# Patient Record
Sex: FEMALE | Race: WHITE | NOT HISPANIC OR LATINO | Employment: FULL TIME | URBAN - METROPOLITAN AREA
[De-identification: names, ages, dates, MRNs, and addresses within clinical notes are randomized per-mention and may not be internally consistent; named-entity substitution may affect disease eponyms.]

---

## 2018-08-31 LAB
EXTERNAL CHLAMYDIA RESULT: NEGATIVE
N GONORRHOEA RRNA SPEC QL PROBE: NEGATIVE

## 2021-06-15 ENCOUNTER — TELEPHONE (OUTPATIENT)
Dept: OBGYN CLINIC | Facility: CLINIC | Age: 21
End: 2021-06-15

## 2021-06-15 DIAGNOSIS — Z30.011 ENCOUNTER FOR PRESCRIPTION OF ORAL CONTRACEPTIVES: Primary | ICD-10-CM

## 2021-06-15 RX ORDER — DROSPIRENONE AND ETHINYL ESTRADIOL 0.02-3(28)
1 KIT ORAL DAILY
Qty: 28 TABLET | Refills: 0 | Status: SHIPPED | OUTPATIENT
Start: 2021-06-15 | End: 2021-07-15 | Stop reason: SDUPTHER

## 2021-06-15 NOTE — TELEPHONE ENCOUNTER
Paloma Antelmo called requesting additional refill of OCP  Advised she  Was informed on 3/24/2021 she would need to be seen before additional refills could be provided per provider  Patient cancelled [facility] 2/2021  She was in agreement to schedule and stated she would call back in 2 weeks to schedule  Patient called to schedule [place] today  Provided appt for 7/15/2021  She was aware she was advised needed office appt prior to additional refills  Paloma Rodriges requests nurse to send her message to her provider  Tona Barajas, please advise

## 2021-06-28 ENCOUNTER — VBI (OUTPATIENT)
Dept: ADMINISTRATIVE | Facility: OTHER | Age: 21
End: 2021-06-28

## 2021-06-28 NOTE — TELEPHONE ENCOUNTER
Upon review of the In Basket request we were able to locate, review, and update the patient chart as requested for Chlamydia and Pap Smear (HPV) aka Cervical Cancer Screening  Any additional questions or concerns should be emailed to the Practice Liaisons via [email or web address] email, please do not reply via In Basket      Thank you  Rosana Hartmann

## 2021-07-15 ENCOUNTER — ANNUAL EXAM (OUTPATIENT)
Dept: OBGYN CLINIC | Facility: CLINIC | Age: 21
End: 2021-07-15
Payer: COMMERCIAL

## 2021-07-15 VITALS
SYSTOLIC BLOOD PRESSURE: 100 MMHG | BODY MASS INDEX: 19.53 KG/M2 | DIASTOLIC BLOOD PRESSURE: 68 MMHG | HEIGHT: 65 IN | WEIGHT: 117.2 LBS

## 2021-07-15 DIAGNOSIS — R87.612 LGSIL ON PAP SMEAR OF CERVIX: ICD-10-CM

## 2021-07-15 DIAGNOSIS — Z01.419 ENCOUNTER FOR GYNECOLOGICAL EXAMINATION WITHOUT ABNORMAL FINDING: Primary | ICD-10-CM

## 2021-07-15 DIAGNOSIS — Z30.011 ENCOUNTER FOR PRESCRIPTION OF ORAL CONTRACEPTIVES: ICD-10-CM

## 2021-07-15 DIAGNOSIS — Z12.4 ENCOUNTER FOR PAPANICOLAOU SMEAR FOR CERVICAL CANCER SCREENING: ICD-10-CM

## 2021-07-15 DIAGNOSIS — Z11.3 SCREEN FOR STD (SEXUALLY TRANSMITTED DISEASE): ICD-10-CM

## 2021-07-15 PROCEDURE — 99395 PREV VISIT EST AGE 18-39: CPT | Performed by: NURSE PRACTITIONER

## 2021-07-15 RX ORDER — DROSPIRENONE AND ETHINYL ESTRADIOL 0.02-3(28)
1 KIT ORAL DAILY
Qty: 84 TABLET | Refills: 3 | Status: SHIPPED | OUTPATIENT
Start: 2021-07-15

## 2021-07-15 RX ORDER — FLUOXETINE 10 MG/1
1 CAPSULE ORAL EVERY MORNING
COMMUNITY
Start: 2021-06-24

## 2021-07-15 NOTE — PATIENT INSTRUCTIONS
Pap repeated today Low grade change last year  STI testing as indicated, exercise most days of week, obtain appropriate diet and hydration, Calcium 1000mg + 600 vit D daily, birth control as directed  RX renewed  Condom use when sexually active for sexually transmitted infection prevention     Annual mammogram starting at age 34, monthly breast self exam  [facility] 20 times twice daily

## 2021-07-15 NOTE — PROGRESS NOTES
Assessment/Plan:  Pap repeated today Low grade change last year  Discussed nature of HPV Did receive Gardisil STI testing as indicated, exercise most days of week, obtain appropriate diet and hydration, Calcium 1000mg + 600 vit D daily, birth control as directed  RX renewed  Condom use when sexually active for sexually transmitted infection prevention     Annual mammogram starting at age 34, monthly breast self exam  Armida Schneider 20 times twice daily       Diagnoses and all orders for this visit:    Encounter for gynecological examination without abnormal finding  -     Cancel: IGP, CtNg, rfx Aptima HPV ASCU  -     IGP, CtNg, rfx Aptima HPV ASCU    Encounter for Papanicolaou smear for cervical cancer screening  -     Cancel: IGP, CtNg, rfx Aptima HPV ASCU  -     IGP, CtNg, rfx Aptima HPV ASCU    Encounter for prescription of oral contraceptives    LGSIL on Pap smear of cervix    Screen for STD (sexually transmitted disease)  -     Cancel: IGP, CtNg, rfx Aptima HPV ASCU  -     IGP, CtNg, rfx Aptima HPV ASCU    Other orders  -     FLUoxetine (PROzac) 10 mg capsule; Take 1 capsule by mouth every morning  -     Multiple Vitamins-Minerals (MULTIVITAMIN WOMEN PO); Take 1 tablet by mouth daily          Subjective:      Patient ID: Susu Dominguez is a 23 y o  female  Here for annual gyn exam   On Ocp Periods monthly normal   Denies BTB  Denies abdominal or pelvic pain  Bowel and bladder are normal Studied abroad in [place] last year was 2 1/2 mos then sent home due to covid  [facility] [place], [place] 12/2019 LGSIL for repeat pap today 4 new partners the last year  Did receive Gardisil   Got a new job in [employer], Will move in Aug      The following portions of the patient's history were reviewed and updated as appropriate: allergies, current medications, past family history, past medical history, past social history, past surgical history and problem list     Review of Systems Constitutional: Negative for fatigue and unexpected weight change  Respiratory: Negative for shortness of breath  Cardiovascular: Negative for chest pain and palpitations  Gastrointestinal: Negative for abdominal distention, abdominal pain, constipation and diarrhea  Genitourinary: Negative for difficulty urinating, dyspareunia, dysuria, frequency, genital sores, menstrual problem, pelvic pain, urgency, vaginal bleeding, vaginal discharge and vaginal pain  Neurological: Negative for headaches  Psychiatric/Behavioral: Negative  Negative for dysphoric mood  The patient is not nervous/anxious  Objective:      /68 (BP Location: Left arm, Patient Position: Sitting, Cuff Size: Standard)   Ht 5' 5" (1 651 m)   Wt 53 2 kg (117 lb 3 2 oz)   LMP 07/14/2021 (Exact Date)   Breastfeeding No   BMI 19 50 kg/m²          Physical Exam  Vitals and nursing note reviewed  Constitutional:       General: She is not in acute distress  Appearance: Normal appearance  HENT:      Head: Normocephalic and atraumatic  Pulmonary:      Effort: Pulmonary effort is normal    Chest:      Breasts: Breasts are symmetrical          Right: Normal  No mass, nipple discharge, skin change or tenderness  Left: Normal  No mass, nipple discharge, skin change or tenderness  Abdominal:      General: There is no distension  Palpations: Abdomen is soft  Tenderness: There is no abdominal tenderness  There is no guarding or rebound  Genitourinary:     General: Normal vulva  Exam position: Lithotomy position  Labia:         Right: No rash, tenderness, lesion or injury  Left: No rash, tenderness, lesion or injury  Urethra: No prolapse, urethral pain, urethral swelling or urethral lesion  Vagina: Normal  No erythema or lesions  Cervix: No cervical motion tenderness, discharge, lesion or cervical bleeding        Uterus: Normal        Adnexa: Right adnexa normal and left adnexa normal         Right: No mass or tenderness  Left: No mass or tenderness  Rectum: No mass or external hemorrhoid  Comments: PAP obtained from cervix  Musculoskeletal:         General: Normal range of motion  Lymphadenopathy:      Upper Body:      Right upper body: No axillary adenopathy  Left upper body: No axillary adenopathy  Lower Body: No right inguinal adenopathy  No left inguinal adenopathy  Skin:     General: Skin is warm and dry  Neurological:      Mental Status: She is alert and oriented to person, place, and time  Psychiatric:         Mood and Affect: Mood normal          Behavior: Behavior normal          Thought Content:  Thought content normal          Judgment: Judgment normal

## 2021-07-28 LAB
C TRACH RRNA CVX QL NAA+PROBE: NEGATIVE
CYTOLOGIST CVX/VAG CYTO: ABNORMAL
DX ICD CODE: ABNORMAL
DX ICD CODE: ABNORMAL
HPV I/H RISK 4 DNA CVX QL PROBE+SIG AMP: NEGATIVE
N GONORRHOEA RRNA CVX QL NAA+PROBE: NEGATIVE
OTHER STN SPEC: ABNORMAL
OTHER STN SPEC: ABNORMAL
PATH REPORT.FINAL DX SPEC: ABNORMAL
PATHOLOGIST CVX/VAG CYTO: ABNORMAL
SL AMB NOTE:: ABNORMAL
SL AMB SPECIMEN ADEQUACY: ABNORMAL
SL AMB TEST METHODOLOGY: ABNORMAL